# Patient Record
Sex: FEMALE | Race: WHITE | NOT HISPANIC OR LATINO
[De-identification: names, ages, dates, MRNs, and addresses within clinical notes are randomized per-mention and may not be internally consistent; named-entity substitution may affect disease eponyms.]

---

## 2018-02-07 PROBLEM — Z00.00 ENCOUNTER FOR PREVENTIVE HEALTH EXAMINATION: Status: ACTIVE | Noted: 2018-02-07

## 2018-02-13 ENCOUNTER — APPOINTMENT (OUTPATIENT)
Dept: ORTHOPEDIC SURGERY | Facility: CLINIC | Age: 68
End: 2018-02-13
Payer: MEDICARE

## 2018-02-13 VITALS
WEIGHT: 140 LBS | DIASTOLIC BLOOD PRESSURE: 69 MMHG | HEART RATE: 89 BPM | HEIGHT: 64 IN | SYSTOLIC BLOOD PRESSURE: 137 MMHG | BODY MASS INDEX: 23.9 KG/M2

## 2018-02-13 DIAGNOSIS — Z86.69 PERSONAL HISTORY OF OTHER DISEASES OF THE NERVOUS SYSTEM AND SENSE ORGANS: ICD-10-CM

## 2018-02-13 DIAGNOSIS — Z78.9 OTHER SPECIFIED HEALTH STATUS: ICD-10-CM

## 2018-02-13 DIAGNOSIS — Z87.39 PERSONAL HISTORY OF OTHER DISEASES OF THE MUSCULOSKELETAL SYSTEM AND CONNECTIVE TISSUE: ICD-10-CM

## 2018-02-13 DIAGNOSIS — S42.292P OTHER DISPLACED FRACTURE OF UPPER END OF LEFT HUMERUS, SUBSEQUENT ENCOUNTER FOR FRACTURE WITH MALUNION: ICD-10-CM

## 2018-02-13 DIAGNOSIS — Z86.39 PERSONAL HISTORY OF OTHER ENDOCRINE, NUTRITIONAL AND METABOLIC DISEASE: ICD-10-CM

## 2018-02-13 DIAGNOSIS — Z86.79 PERSONAL HISTORY OF OTHER DISEASES OF THE CIRCULATORY SYSTEM: ICD-10-CM

## 2018-02-13 DIAGNOSIS — Z87.891 PERSONAL HISTORY OF NICOTINE DEPENDENCE: ICD-10-CM

## 2018-02-13 PROCEDURE — 73030 X-RAY EXAM OF SHOULDER: CPT | Mod: LT

## 2018-02-13 PROCEDURE — 99203 OFFICE O/P NEW LOW 30 MIN: CPT

## 2018-02-13 RX ORDER — METFORMIN HYDROCHLORIDE 500 MG/1
500 TABLET, COATED ORAL
Refills: 0 | Status: ACTIVE | COMMUNITY

## 2018-02-13 RX ORDER — ASPIRIN ENTERIC COATED TABLETS 81 MG 81 MG/1
81 TABLET, DELAYED RELEASE ORAL
Refills: 0 | Status: ACTIVE | COMMUNITY

## 2018-02-13 RX ORDER — GLIMEPIRIDE 4 MG/1
4 TABLET ORAL
Refills: 0 | Status: ACTIVE | COMMUNITY

## 2018-02-13 RX ORDER — OMEGA-3/DHA/EPA/FISH OIL 1200 MG
1200 CAPSULE ORAL
Refills: 0 | Status: ACTIVE | COMMUNITY

## 2018-02-13 RX ORDER — BACILLUS COAGULANS/INULIN 1B-250 MG
CAPSULE ORAL
Refills: 0 | Status: ACTIVE | COMMUNITY

## 2018-02-13 RX ORDER — CAMPHOR 0.45 %
25 GEL (GRAM) TOPICAL
Refills: 0 | Status: ACTIVE | COMMUNITY

## 2021-11-11 ENCOUNTER — NON-APPOINTMENT (OUTPATIENT)
Age: 71
End: 2021-11-11

## 2021-11-19 ENCOUNTER — APPOINTMENT (OUTPATIENT)
Dept: PAIN MANAGEMENT | Facility: CLINIC | Age: 71
End: 2021-11-19
Payer: MEDICARE

## 2021-11-19 VITALS
HEIGHT: 63 IN | TEMPERATURE: 98 F | SYSTOLIC BLOOD PRESSURE: 152 MMHG | WEIGHT: 140 LBS | BODY MASS INDEX: 24.8 KG/M2 | DIASTOLIC BLOOD PRESSURE: 83 MMHG

## 2021-11-19 PROCEDURE — 99204 OFFICE O/P NEW MOD 45 MIN: CPT

## 2021-11-19 NOTE — PHYSICAL EXAM
[Normal muscle bulk without asymmetry] : normal muscle bulk without asymmetry [Facet Tenderness] : no facet tenderness [] : Motor: [NL] : normal and symmetric bilaterally [Normal] : Normal affect [de-identified] : Constitutional: Normal, well developed, no acute distress\par Eyes: Symmetric, External structures \par Oropharynx: Lips normal, symmetric, no external lesions appreciated\par Respiratory: Non-labored breathing, no audible wheezes\par Cardiac: Pulse palpated, no tachycardia\par Vascular: No cyanosis appreciated, no edema in bilateral lower extremities\par GI: Nondistended, no jaundice appreciated\par Neurovascular: CN2-12 grossly intact, Alert and oriented\par MSK: Normal muscle bulk, 5/5 Motor strength B/L in LE\par \par

## 2021-11-19 NOTE — ASSESSMENT
[FreeTextEntry1] : >> Imaging and Other Studies\par \par I personally reviewed the relevant imaging.  Discussed and explained to patient the likely source of pathology and pain.  Questions answered. XR\par \par back and leg pain likely secondary to lumbar radiculopathy and discogenic pain refractory to conservative treatments including 6 consecutive weeks of home exercises/PT, will obtain MRI LS to evaluate for pathology\par \par may consider PT vs intervention pending eval\par \par >> Therapy and Other Modalities\par \par >> Medications\par  \par >> Interventions\par \par na\par \par >> Consults\par \par \par patient very interested in surgical intervention - may consider evaluation with Dr. Meza\par \par >> Discussion of Risks/Benefits/Alternatives\par \par 	>Regarding any scheduled procedures:\par \par I have discussed in detail with the patient that any interventional pain procedure is associated with potential risks.  The procedure may include an injection of steroids and potentially other medications (local anesthetic and normal saline) into the epidural space or surrounding tissue of the spine.  There are significant risks of this procedure which include and are not limited to infection, bleeding, worsening pain, dural puncture leading to postdural puncture headache, nerve damage, spinal cord injury, paralysis, stroke, and death.  \par \par There is a chance that the procedure does not improve their pain.  \par \par There are risks associated with the steroid being absorbed into the body systemically.  These include dysphoria, difficulty sleeping, mood swings and personality changes.  Premenopausal women may notice an irregularity in her menstrual cycle for 2-3 months following the injection.  Steroids can specifically affect patients with hypertension, diabetes, and peptic ulcers.  The procedure may cause a temporary increase in blood pressure and blood pressure, and may adversely affect a peptic ulcer.  Other, more rare complications, include avascular necrosis of joints, glaucoma and worsening of osteoporosis. \par \par I have discussed the risks of the procedure at length with the patient, and the potential benefits of pain relief.  I have offered alternatives to the procedure.  All questions were answered.  \par \par The patient expressed understanding and wishes to proceed with the procedure.\par \par 	>Regarding COVID19 Pandemic: \par \par Any planned interventional pain procedure are scheduled because further delay may cause harm or negative outcome to patient.  The goal in performing this procedure is to avoid deterioration of function, emergency room visits (which increases exposure) and reliance on opioids.  \par \par r/b/a discussed with patient, lack of evidence to conclusively determine whether pain management procedures have any positive or negative impact on the possibility of ruchi the virus and/or development of any sequelae. \par \par Patient counselled regarding timing steroid based intervention 2 weeks before or after COVID-19 vaccine administration to avoid any interaction or affect on efficacy of vaccination\par \par Patient demonstrates understanding\par \par Informed patient that risks associated with the COVID-19 infection.  Informed patient steps taken to limit the risks.  We are implementing safety precautions and following protocols consistent with the CDC and state recommendations. All patients and staff will be checked for fever or signs of illness upon entry to the facility. We will limit our steroid dose to the lowest effective therapeutic dose or in some cases steroids will not be injected at all. \par \par Patient agrees to proceed\par \par >> Conclusion\par \par The above diagnosis and treatment plan is medically reasonable and necessary based on the patient encounter \par There were no barriers to communication.\par Informed patient that I would be available for any additional questions.\par Patient was instructed to call with any worsening symptoms including severe pain, new numbness/weakness, or changes in the bowel/bladder function. \par Discussed role of nsaids in pain management and all relevant risks, if patient is continuing to require after 4 weeks the patient should f/u for alternative treatment. \par Instructed patient to maintain pain diary to monitor pain level, mobility, and function.\par \par

## 2021-11-19 NOTE — HISTORY OF PRESENT ILLNESS
[___ yrs] : [unfilled] year(s) ago [Paroxysmal] : paroxysmal [7] : an average pain level of 7/10 [5] : a minimum pain level of 5/10 [8] : a maximum pain level of 8/10 [Dull] : dull [Aching] : aching [Burning] : burning [Shooting] : shooting [Electric] : electric [Sitting] : sitting [Standing] : standing [Walking] : walking [Lifting] : lifting [Medications] : medications [Heat] : heat [Ice] : ice [FreeTextEntry1] : HPI\par \par Ms. SABA RIVERO is a 71 year F with pmhx of CAD sp stents and valvulopathy on asa 81mg followed by Dr. Shrestha, htn, DM, benign tremors, sp cervical spine decompression in 2018 Dr. Xie, Lumbar spine decompression 2006.  presents with right back, buttock, and right>>L anterolateral thigh and shin pain.  Pain is so bad that patient finds it difficult to perform adls and ambulate. denies any worsening numbness, weakness, bowel/bladder dysfunction. \par \par \par Previous and current pain medications/doses/effects:\par \par Tylenol with mild improvement\par \par Previous Pain Treatments:\par \par PT and exercises without improvement\par \par Previous Pain Injections:\par \par na\par \par Previous Diagnostic Studies/Images:\par \par XR LS 9/21\par \par Spondylosis of the lumbar spine is noted no spondylolisthesis not appreciated\par  [FreeTextEntry2] : 21 [FreeTextEntry7] : No referral. Lower Back pain  [FreeTextEntry3] : stairs,  [FreeTextEntry4] : Has done PT in the past

## 2021-12-07 ENCOUNTER — APPOINTMENT (OUTPATIENT)
Dept: PAIN MANAGEMENT | Facility: CLINIC | Age: 71
End: 2021-12-07
Payer: MEDICARE

## 2021-12-07 DIAGNOSIS — M53.3 SACROCOCCYGEAL DISORDERS, NOT ELSEWHERE CLASSIFIED: ICD-10-CM

## 2021-12-07 DIAGNOSIS — M79.2 NEURALGIA AND NEURITIS, UNSPECIFIED: ICD-10-CM

## 2021-12-07 DIAGNOSIS — M79.18 MYALGIA, OTHER SITE: ICD-10-CM

## 2021-12-07 DIAGNOSIS — G89.4 CHRONIC PAIN SYNDROME: ICD-10-CM

## 2021-12-07 DIAGNOSIS — M96.1 POSTLAMINECTOMY SYNDROME, NOT ELSEWHERE CLASSIFIED: ICD-10-CM

## 2021-12-07 PROCEDURE — 99214 OFFICE O/P EST MOD 30 MIN: CPT | Mod: 95

## 2021-12-07 NOTE — PHYSICAL EXAM
[de-identified] : \par Constitutional: Normal, well developed, no acute distress on audio/video examination\par Eyes: Symmetric, External structures on video examination\par ENT: Lips, mucosa and tongue normal on video examination\par Oropharynx: Lips normal, symmetric, no external lesions appreciated appreciated on video examination\par Respiratory: Non-labored breathing, no audible wheezes appreciated on audio/video examination\par Vascular: No cyanosis appreciated or edema appreciated on video examination\par GI:  no jaundice appreciated on video examination\par Neurovascular: CN grossly intact on video/audio examination, alert\par MSK: Normal muscle bulk on video examination\par

## 2021-12-07 NOTE — ASSESSMENT
[FreeTextEntry1] : >> Imaging and Other Studies\par \par I personally reviewed the relevant imaging.  Discussed and explained to patient the likely source of pathology and pain.  Questions answered. XR\par \par I personally reviewed the relevant imaging.  Discussed and explained to patient the likely source of pathology and pain.  Questions answered. MRI\par \par >> Therapy and Other Modalities\par \par >> Medications\par  \par acetaminophen 650mg q8h prn pain (caution <3g daily)\par \par >> Interventions\par \par na\par \par >> Consults\par \par \par patient very interested in surgical intervention -referral to Dr. Meza\par \par >> Discussion of Risks/Benefits/Alternatives\par \par 	>Regarding any scheduled procedures:\par \par I have discussed in detail with the patient that any interventional pain procedure is associated with potential risks.  The procedure may include an injection of steroids and potentially other medications (local anesthetic and normal saline) into the epidural space or surrounding tissue of the spine.  There are significant risks of this procedure which include and are not limited to infection, bleeding, worsening pain, dural puncture leading to postdural puncture headache, nerve damage, spinal cord injury, paralysis, stroke, and death.  \par \par There is a chance that the procedure does not improve their pain.  \par \par There are risks associated with the steroid being absorbed into the body systemically.  These include dysphoria, difficulty sleeping, mood swings and personality changes.  Premenopausal women may notice an irregularity in her menstrual cycle for 2-3 months following the injection.  Steroids can specifically affect patients with hypertension, diabetes, and peptic ulcers.  The procedure may cause a temporary increase in blood pressure and blood pressure, and may adversely affect a peptic ulcer.  Other, more rare complications, include avascular necrosis of joints, glaucoma and worsening of osteoporosis. \par \par I have discussed the risks of the procedure at length with the patient, and the potential benefits of pain relief.  I have offered alternatives to the procedure.  All questions were answered.  \par \par The patient expressed understanding and wishes to proceed with the procedure.\par \par 	>Regarding COVID19 Pandemic: \par \par Any planned interventional pain procedure are scheduled because further delay may cause harm or negative outcome to patient.  The goal in performing this procedure is to avoid deterioration of function, emergency room visits (which increases exposure) and reliance on opioids.  \par \par r/b/a discussed with patient, lack of evidence to conclusively determine whether pain management procedures have any positive or negative impact on the possibility of ruchi the virus and/or development of any sequelae. \par \par Patient counselled regarding timing steroid based intervention 2 weeks before or after COVID-19 vaccine administration to avoid any interaction or affect on efficacy of vaccination\par \par Patient demonstrates understanding\par \par Informed patient that risks associated with the COVID-19 infection.  Informed patient steps taken to limit the risks.  We are implementing safety precautions and following protocols consistent with the CDC and state recommendations. All patients and staff will be checked for fever or signs of illness upon entry to the facility. We will limit our steroid dose to the lowest effective therapeutic dose or in some cases steroids will not be injected at all. \par \par Patient agrees to proceed\par \par >> Conclusion\par \par The above diagnosis and treatment plan is medically reasonable and necessary based on the patient encounter \par There were no barriers to communication.\par Informed patient that I would be available for any additional questions.\par Patient was instructed to call with any worsening symptoms including severe pain, new numbness/weakness, or changes in the bowel/bladder function. \par Discussed role of nsaids in pain management and all relevant risks, if patient is continuing to require after 4 weeks the patient should f/u for alternative treatment. \par Instructed patient to maintain pain diary to monitor pain level, mobility, and function.\par \par

## 2021-12-07 NOTE — HISTORY OF PRESENT ILLNESS
[Home] : at home, [unfilled] , at the time of the visit. [Medical Office: ([facility])___] : at the medical office located in  [___ yrs] : [unfilled] year(s) ago [Paroxysmal] : paroxysmal [7] : an average pain level of 7/10 [5] : a minimum pain level of 5/10 [8] : a maximum pain level of 8/10 [Dull] : dull [Aching] : aching [Burning] : burning [Shooting] : shooting [Electric] : electric [Sitting] : sitting [Standing] : standing [Walking] : walking [Lifting] : lifting [Medications] : medications [Heat] : heat [Ice] : ice [FreeTextEntry1] : Interval Note:\par \par Since last visit the pain is not improved.  Continues to have significant pain over the right lower back, buttock, leg. Denies any additional weakness, numbness, bowel/bladder dysfunction.   Pain is so bad that patient finds it difficult to perform adls and ambulate. \par \par \par HPI\par \par Ms. SABA RIVERO is a 71 year F with pmhx of CAD sp stents and valvulopathy on asa 81mg followed by Dr. Shrestha, htn, DM, benign tremors, sp cervical spine decompression in 2018 Dr. Xie, Lumbar spine decompression 2006.  presents with right back, buttock, and right>>L anterolateral thigh and shin pain.  Pain is so bad that patient finds it difficult to perform adls and ambulate. denies any worsening numbness, weakness, bowel/bladder dysfunction. \par \par \par Previous and current pain medications/doses/effects:\par \par Tylenol with mild improvement\par \par Previous Pain Treatments:\par \par PT and exercises without improvement\par \par Previous Pain Injections:\par \par na\par \par Previous Diagnostic Studies/Images:\par \par MRI LS 12/21\par \par Mild disc bulge with posterior annular fussure at L1-2\par Mild-moderate spinal stenosis at L2-3\par Severe stenosis L3-4 and right foraminal stenosis\par Disc bulge and posterior annular fissure at L4-5 and asymmetric facet hypertrophy right more pronounced than left with encroachment on the right subarticular recess and deforming right aspect of thecal sac.  Mild-moderate canal stenosis and mild right foraminal narrowing.  THinning of right laminectomy at this level suggesting postsurgical changes\par Left sided facet arthropathy at L5-S1 \par XR LS 9/21\par \par Spondylosis of the lumbar spine is noted no spondylolisthesis not appreciated\par  [FreeTextEntry7] : No referral. Lower Back pain  [FreeTextEntry3] : stairs,  [FreeTextEntry4] : Has done PT in the past

## 2021-12-20 ENCOUNTER — APPOINTMENT (OUTPATIENT)
Dept: NEUROSURGERY | Facility: CLINIC | Age: 71
End: 2021-12-20
Payer: MEDICARE

## 2021-12-20 DIAGNOSIS — Z01.818 ENCOUNTER FOR OTHER PREPROCEDURAL EXAMINATION: ICD-10-CM

## 2021-12-20 PROCEDURE — 99215 OFFICE O/P EST HI 40 MIN: CPT

## 2021-12-20 PROCEDURE — 99205 OFFICE O/P NEW HI 60 MIN: CPT

## 2021-12-20 NOTE — PHYSICAL EXAM
[General Appearance - Alert] : alert [General Appearance - In No Acute Distress] : in no acute distress [Oriented To Time, Place, And Person] : oriented to person, place, and time [Impaired Insight] : insight and judgment were intact [Affect] : the affect was normal [Person] : oriented to person [Place] : oriented to place [Time] : oriented to time [Short Term Intact] : short term memory intact [Remote Intact] : remote memory intact [Span Intact] : the attention span was normal [Concentration Intact] : normal concentrating ability [Fluency] : fluency intact [Comprehension] : comprehension intact [Current Events] : adequate knowledge of current events [Past History] : adequate knowledge of personal past history [Vocabulary] : adequate range of vocabulary [Cranial Nerves Optic (II)] : visual acuity intact bilaterally,  pupils equal round and reactive to light [Cranial Nerves Oculomotor (III)] : extraocular motion intact [Cranial Nerves Trigeminal (V)] : facial sensation intact symmetrically [Cranial Nerves Facial (VII)] : face symmetrical [Cranial Nerves Vestibulocochlear (VIII)] : hearing was intact bilaterally [Cranial Nerves Glossopharyngeal (IX)] : tongue and palate midline [Cranial Nerves Accessory (XI - Cranial And Spinal)] : head turning and shoulder shrug symmetric [Motor Tone] : muscle tone was normal in all four extremities [Cranial Nerves Hypoglossal (XII)] : there was no tongue deviation with protrusion [Motor Strength] : muscle strength was normal in all four extremities [No Muscle Atrophy] : normal bulk in all four extremities [Sensation Tactile Decrease] : light touch was intact [Abnormal Walk] : normal gait [Balance] : balance was intact [2+] : Patella left 2+ [Past-pointing] : there was no past-pointing [Tremor] : no tremor present [Straight-Leg Raise Test - Left] : straight leg raise of the left leg was negative [Straight-Leg Raise Test - Right] : straight leg raise  of the right leg was negative

## 2021-12-20 NOTE — END OF VISIT
[FreeTextEntry3] : I have seen the patient and reviewed the case together with PA and I agree with the final recommendations and plan of care.\par \par Nasim Meza MD\par Neurosurgery\par \par  [Time Spent: ___ minutes] : I have spent [unfilled] minutes of time on the encounter. [>50% of the face to face encounter time was spent on counseling and/or coordination of care for ___] : Greater than 50% of the face to face encounter time was spent on counseling and/or coordination of care for [unfilled]

## 2021-12-20 NOTE — ASSESSMENT
[FreeTextEntry1] : I have discussed the natural history and treatment options for neurogenic claudication due to lumbar spinal stenosis with the patient. I explained the indications for observation, conservative management, medical management, physical therapy, pain management approaches and surgery. I explained the different types and surgical approaches including anterior and posterior approaches as well as decompression only procedures and instrumented fusions. I discussed the risks, benefits, possible complications and expected outcome related to each treatment option. The risks of surgery were discussed in detail including but not limited to postoperative infection at the surgical site, hospital acquired pneumonia, hospital acquired urinary tract infection, postoperative meningitis, wound dehiscence, CSF leak, stroke (ischemic and hemorrhagic), postoperative seizures, worsening motor function due to spinal cord or nerve injury, postoperative visual deficit which could be permanent (blindness) when surgery is performed in a prone position, cardiovascular complications (MI, PE, DVT) and I also explained that some of these complications could lead to sepsis, coma or even death.\par \par In the end, I recommend conservative management in the form of PT and [place] by Pain management given her medical comorbidities and confounding peripheral neuropathy and orthopedic conditions.  She should follow up with us in the office in 2 months to assess her progress. \par The patient understands the plan of care and is in agreement.  All questions answered to patient satisfaction.\par \par

## 2021-12-20 NOTE — HISTORY OF PRESENT ILLNESS
[de-identified] : SABA RIVERO is a 71 year female with a PMH of HTN, DM, HLD, benign essential tremor, spinal stenosis s/p Cervical lami/decompression 2018 by Dr. Xie and prior lumbar lami/decompression L4-5 2006, CAD s/p stents and valvuloplasty on baby ASA who presents to the office today for neurosurgical consultation due to long standing right low back, buttock and right >left anterolateral thigh and anterior shin pain.  The pain started over 10 years ago. It is exacerbated by ambulation, PT, going up and down stairs and relieved by rest and tylenol.  It radiates down mainly into right hip. There has been no known trauma precipitating the event.  The patient endorses numbness of extremities and tingling but denies weakness of extremities.  She does report multiple falls.  She has bowel or bladder incontinence that has been longstanding for many years.  The patient has not tried [place] but has been offered [place] by Dr. Bianchi, which she is currently considering.  She has done PT, and pain medications including tylenol in the last year without relief.  She has undergone Imaging in the form of MRI LS spine 12/2021 which revealed severe stenosis at L3-4 and mild-mod stenosis at L2-3 and L4-5. (see report below)\par  \par

## 2022-04-15 ENCOUNTER — HOSPITAL ENCOUNTER (EMERGENCY)
Dept: HOSPITAL 74 - JER | Age: 72
Discharge: HOME | End: 2022-04-15
Payer: COMMERCIAL

## 2022-04-15 VITALS — DIASTOLIC BLOOD PRESSURE: 76 MMHG | HEART RATE: 94 BPM | SYSTOLIC BLOOD PRESSURE: 133 MMHG | TEMPERATURE: 97.8 F

## 2022-04-15 VITALS — BODY MASS INDEX: 24.7 KG/M2

## 2022-04-15 DIAGNOSIS — E86.0: ICD-10-CM

## 2022-04-15 DIAGNOSIS — R19.7: ICD-10-CM

## 2022-04-15 DIAGNOSIS — R53.1: Primary | ICD-10-CM

## 2022-04-15 LAB
ALBUMIN SERPL-MCNC: 3.4 G/DL (ref 3.4–5)
ALP SERPL-CCNC: 59 U/L (ref 45–117)
ALT SERPL-CCNC: 20 U/L (ref 13–61)
ANION GAP SERPL CALC-SCNC: 8 MMOL/L (ref 8–16)
APPEARANCE UR: (no result)
AST SERPL-CCNC: 9 U/L (ref 15–37)
BACTERIA # UR AUTO: 719 /UL (ref 0–1359)
BASOPHILS # BLD: 0.9 % (ref 0–2)
BILIRUB SERPL-MCNC: 0.2 MG/DL (ref 0.2–1)
BILIRUB UR STRIP.AUTO-MCNC: NEGATIVE MG/DL
BUN SERPL-MCNC: 35.9 MG/DL (ref 7–18)
CALCIUM SERPL-MCNC: 9.7 MG/DL (ref 8.5–10.1)
CASTS URNS QL MICRO: 1 /UL (ref 0–3.1)
CHLORIDE SERPL-SCNC: 104 MMOL/L (ref 98–107)
CO2 SERPL-SCNC: 24 MMOL/L (ref 21–32)
COLOR UR: YELLOW
CREAT SERPL-MCNC: 1.1 MG/DL (ref 0.55–1.3)
DEPRECATED RDW RBC AUTO: 15.7 % (ref 11.6–15.6)
EOSINOPHIL # BLD: 1.1 % (ref 0–4.5)
EPITH CASTS URNS QL MICRO: 2 /UL (ref 0–25.1)
GLUCOSE SERPL-MCNC: 148 MG/DL (ref 74–106)
HCT VFR BLD CALC: 34.1 % (ref 32.4–45.2)
HGB BLD-MCNC: 11.1 GM/DL (ref 10.7–15.3)
KETONES UR QL STRIP: NEGATIVE
LEUKOCYTE ESTERASE UR QL STRIP.AUTO: (no result)
LIPASE SERPL-CCNC: 106 U/L (ref 73–393)
LYMPHOCYTES # BLD: 22.8 % (ref 8–40)
MAGNESIUM SERPL-MCNC: 2 MG/DL (ref 1.8–2.4)
MCH RBC QN AUTO: 27.1 PG (ref 25.7–33.7)
MCHC RBC AUTO-ENTMCNC: 32.4 G/DL (ref 32–36)
MCV RBC: 83.5 FL (ref 80–96)
MONOCYTES # BLD AUTO: 6.9 % (ref 3.8–10.2)
NEUTROPHILS # BLD: 68.3 % (ref 42.8–82.8)
NITRITE UR QL STRIP: NEGATIVE
PH UR: 7 [PH] (ref 5–8)
PHOSPHATE SERPL-MCNC: 4.9 MG/DL (ref 2.5–4.9)
PLATELET # BLD AUTO: 321 10^3/UL (ref 134–434)
PMV BLD: 7.9 FL (ref 7.5–11.1)
PROT SERPL-MCNC: 7 G/DL (ref 6.4–8.2)
PROT UR QL STRIP: (no result)
PROT UR QL STRIP: NEGATIVE
RBC # BLD AUTO: 23 /UL (ref 0–23.9)
RBC # BLD AUTO: 4.09 M/MM3 (ref 3.6–5.2)
SODIUM SERPL-SCNC: 137 MMOL/L (ref 136–145)
SP GR UR: 1.01 (ref 1.01–1.03)
UROBILINOGEN UR STRIP-MCNC: 0.2 MG/DL (ref 0.2–1)
WBC # BLD AUTO: 14.8 K/MM3 (ref 4–10)
WBC # UR AUTO: 2603 /UL (ref 0–25.8)

## 2022-04-15 PROCEDURE — U0005 INFEC AGEN DETEC AMPLI PROBE: HCPCS

## 2022-04-15 PROCEDURE — U0003 INFECTIOUS AGENT DETECTION BY NUCLEIC ACID (DNA OR RNA); SEVERE ACUTE RESPIRATORY SYNDROME CORONAVIRUS 2 (SARS-COV-2) (CORONAVIRUS DISEASE [COVID-19]), AMPLIFIED PROBE TECHNIQUE, MAKING USE OF HIGH THROUGHPUT TECHNOLOGIES AS DESCRIBED BY CMS-2020-01-R: HCPCS

## 2023-03-15 ENCOUNTER — APPOINTMENT (OUTPATIENT)
Dept: NEUROSURGERY | Facility: CLINIC | Age: 73
End: 2023-03-15
Payer: MEDICARE

## 2023-03-15 VITALS
DIASTOLIC BLOOD PRESSURE: 81 MMHG | HEART RATE: 73 BPM | SYSTOLIC BLOOD PRESSURE: 145 MMHG | OXYGEN SATURATION: 99 % | HEIGHT: 63 IN | BODY MASS INDEX: 24.8 KG/M2 | WEIGHT: 140 LBS

## 2023-03-15 PROCEDURE — 99215 OFFICE O/P EST HI 40 MIN: CPT

## 2023-03-15 NOTE — ASSESSMENT
[FreeTextEntry1] : I have discussed the natural history and treatment options for neurogenic claudication due to lumbar spinal stenosis with the patient. I explained the indications for observation, conservative management, medical management, physical therapy, pain management approaches and surgery. I explained the different types and surgical approaches including anterior and posterior approaches as well as decompression only procedures and instrumented fusions. I discussed the risks, benefits, possible complications and expected outcome related to each treatment option. \par \par In the end, I believe Ms. Hanks is a surgical candidate due to refractory symptoms of neurogenic claudication.  Since she has had an acute exacerbation of low back pain and right radicular pain since her MILD procedure, I would recommend new imaging in the form of MRI L spine to r/o new pathology as well as flexion extension xrays.  We will see her back in the office once imaging completed for further treatment planning.  If surgery is planned, she was instructed that she will require cardiac clearance from her cardiologist Dr. Shrestha at [facility], and would need to be cleared to be off of her ASA for 1 week prior to surgery and 1-2 weeks post operatively. \par \par The patient understands the plan of care and is in agreement.  All questions answered to patient satisfaction.\par

## 2023-03-15 NOTE — DATA REVIEWED
[de-identified] : Patient Name:   MILAGROS RIVEROALocation:PER\par Med Rec #: 	  [id number]		Account #: 	[id number]	\par YOB: 1950		Ordering:	Mack Cervantes MD	\par Age: 72	      Sex:    F		Attending:		\par PCP:	     Racheal Sue MD\par ______________________________________________________________________________________\par 						\par Exam Date: 	  03/06/23					\par Exam: 	CT LUMBAR SPINE					\par Order#:	[id number]					\par             \par \par \par Noncontrast CT lumbar spine\par \par INDICATION: Back pain, recent spinal surgical procedure.\par \par COMPARISON: None available \par \par FINDINGS:\par Preservation of lumbar lordosis. No significant scoliosis. Multilevel loss of intervertebral disc height most pronounced at the L3-4 and L4-5 levels. Vacuum disc phenomenon at L4-5. Erosive changes involving the left superior endplate of L3 as well as the midline and right superior endplates of L4 on L5 consistent with degenerative changes versus Schmorl's node. No compression fracture deformity appreciated. No blastic or lytic lesion. Spinous processes are intact. Transverse processes are intact. The bilateral psoas muscles are symmetric and intact. No paravertebral mass. The dorsal soft tissues appear grossly unremarkable. No fluid/abscess collection.\par \par At L1-2 there is no significant canal stenosis or foraminal narrowing.\par At L2-3 mild posterior disc bulge with ligamentum flavum thickening and facet arthropathy resulting in moderate lumbar canal stenosis. No significant foraminal narrowing.\par At L3-4 posterior disc bulge with ligamentum flavum thickening and bilateral facet arthropathy resulting in severe canal stenosis with moderate to severe right and left foraminal narrowing.. The visualized soft tissue thickening within this region which is possibly related to the patient's recent surgical procedure.\par At L4-5 there is disc bulge with ligamentum flavum thickening and bilateral facet arthropathy resulting in severe canal stenosis and bilateral foraminal narrowing.\par At L5-S1 there is mild posterior disc bulge with no significant canal stenosis. No significant foraminal narrowing.\par \par Limited evaluation of the included lower abdomen and pelvis demonstrates stool distended rectum. Partially visualized distended urinary bladder. And an incompletely visualized left renal cyst.\par \par \par IMPRESSION:\par No compression deformity. No fluid/abscess collection within the surrounding soft tissues.\par \par Marked degenerative changes at the L3-4 and L4-5 level where there is severe canal stenosis and moderate to severe bilateral foraminal narrowing. At L2-3 there is moderate canal stenosis. Please note that these levels are poorly evaluated on this CT study. (If the patient's symptoms persist and there is clinical concern for spinal infection consider further correlation with contrast MRI).\par \par --- End of Report ---\par \par ***Electronically Signed ***\par -----------------------------------------------Claudiapar Kamran Peng MD              03/06/23 2044\par \par Dictated on 03/06/23\par

## 2023-03-15 NOTE — END OF VISIT
[FreeTextEntry3] : I have seen the patient and reviewed the case together with PA and I agree with the final recommendations and plan of care.\par \par Nasim Meza MD\par Neurosurgery\par \par

## 2023-03-15 NOTE — ASSESSMENT
[FreeTextEntry1] : I have discussed the natural history and treatment options for neurogenic claudication due to lumbar spinal stenosis with the patient. I explained the indications for observation, conservative management, medical management, physical therapy, pain management approaches and surgery. I explained the different types and surgical approaches including anterior and posterior approaches as well as decompression only procedures and instrumented fusions. I discussed the risks, benefits, possible complications and expected outcome related to each treatment option. \par \par In the end, I believe Ms. Hanks is a surgical candidate due to refractory symptoms of neurogenic claudication.  Since she has had an acute exacerbation of low back pain and right radicular pain since her MILD procedure, I would recommend new imaging in the form of MRI L spine to r/o new pathology as well as flexion extension xrays.  We will see her back in the office once imaging completed for further treatment planning.  If surgery is planned, she was instructed that she will require cardiac clearance from her cardiologist Dr. Shrestha at [facility], and would need to be cleared to be off of her ASA for 1 week prior to surgery and 1-2 weeks post operatively. \par \par The patient understands the plan of care and is in agreement.  All questions answered to patient satisfaction.\par

## 2023-03-15 NOTE — HISTORY OF PRESENT ILLNESS
[FreeTextEntry1] : Ms. Hanks presents today for neurosurgical follow up.  She was previously seen in our office in December 2021 for complaints of long-standing low back pain, radiating to her buttock and bilateral thighs (right worse than left). Her pain is exacerbated when she is walking and standing for long periods of time, and she describes spasm like pain in the low back at times.  She has R leg/knee buckling and associated numbness into her right foot. She still reports some right groin intermittently at times.  Her spinal surgery history is significant for cervical laminectomy and decompression in 2018 and lumbar laminectomy and decompression at L4-5 in 2006.  At end of last appointment, conservative management in the form physical therapy and pain management evaluation.  This was implemented by the patient and despite conservative treatment, she continues to have progressively worsening symptoms. She reports exacerbation of pain with PT and after MILD procedure and no change in pain after [place].  \par \par She recently underwent MILD procedure under the direction of Dr. José from pain management on 2/23/23.  Since then, she continues to have persistent low back pain.  Localized to the right side of her lower back, radiating into the right buttock and right thigh.  Associated numbness of the right thigh.  She presented to [facility] ED on 3/6/23 with continued exacerbation of symptoms and pain control.  CT of the lumbar spine performed (full details below) revealed no acute changes.  She denies bowel/bladder incontinence, gait instability.\par \par 12/20/21 SABA HANKS is a 71 year female with a PMH of HTN, DM, HLD, benign essential tremor, spinal stenosis s/p Cervical lami/decompression 2018 by Dr. Xie and prior lumbar lami/decompression L4-5 2006, CAD s/p stents and valvuloplasty on baby ASA who presents to the office today for neurosurgical consultation due to long standing right low back, buttock and right >left anterolateral thigh and anterior shin pain. The pain started over 10 years ago. It is exacerbated by ambulation, PT, going up and down stairs and relieved by rest and tylenol. It radiates down mainly into right hip. There has been no known trauma precipitating the event. The patient endorses numbness of extremities and tingling but denies weakness of extremities. She does report multiple falls. She has bowel or bladder incontinence that has been longstanding for many years. The patient has not tried [place] but has been offered [place] by Dr. Bianchi, which she is currently considering. She has done PT, and pain medications including tylenol in the last year without relief. She has undergone Imaging in the form of MRI LS spine 12/2021 which revealed severe stenosis at L3-4 and mild-mod stenosis at L2-3 and L4-5. (see report below)

## 2023-03-15 NOTE — HISTORY OF PRESENT ILLNESS
[FreeTextEntry1] : Ms. Hanks presents today for neurosurgical follow up.  She was previously seen in our office in December 2021 for complaints of long-standing low back pain, radiating to her buttock and bilateral thighs (right worse than left). Her pain is exacerbated when she is walking and standing for long periods of time, and she describes spasm like pain in the low back at times.  She has R leg/knee buckling and associated numbness into her right foot. She still reports some right groin intermittently at times.  Her spinal surgery history is significant for cervical laminectomy and decompression in 2018 and lumbar laminectomy and decompression at L4-5 in 2006.  At end of last appointment, conservative management in the form physical therapy and pain management evaluation.  This was implemented by the patient and despite conservative treatment, she continues to have progressively worsening symptoms. She reports exacerbation of pain with PT and after MILD procedure and no change in pain after [place].  \par \par She recently underwent MILD procedure under the direction of Dr. José from pain management on 2/23/23.  Since then, she continues to have persistent low back pain.  Localized to the right side of her lower back, radiating into the right buttock and right thigh.  Associated numbness of the right thigh.  She presented to [facility] ED on 3/6/23 with continued exacerbation of symptoms and pain control.  CT of the lumbar spine performed (full details below) revealed no acute changes.  She denies bowel/bladder incontinence, gait instability.\par \par 12/20/21 SABA HANKS is a 71 year female with a PMH of HTN, DM, HLD, benign essential tremor, spinal stenosis s/p Cervical lami/decompression 2018 by Dr. Xie and prior lumbar lami/decompression L4-5 2006, CAD s/p stents and valvuloplasty on baby ASA who presents to the office today for neurosurgical consultation due to long standing right low back, buttock and right >left anterolateral thigh and anterior shin pain. The pain started over 10 years ago. It is exacerbated by ambulation, PT, going up and down stairs and relieved by rest and tylenol. It radiates down mainly into right hip. There has been no known trauma precipitating the event. The patient endorses numbness of extremities and tingling but denies weakness of extremities. She does report multiple falls. She has bowel or bladder incontinence that has been longstanding for many years. The patient has not tried [place] but has been offered [place] by Dr. Bianchi, which she is currently considering. She has done PT, and pain medications including tylenol in the last year without relief. She has undergone Imaging in the form of MRI LS spine 12/2021 which revealed severe stenosis at L3-4 and mild-mod stenosis at L2-3 and L4-5. (see report below)

## 2023-03-15 NOTE — DATA REVIEWED
[de-identified] : Patient Name:   MILAGROS RIVEROALocation:PER\par Med Rec #: 	  [id number]		Account #: 	[id number]	\par YOB: 1950		Ordering:	Mack Cervantes MD	\par Age: 72	      Sex:    F		Attending:		\par PCP:	     Racheal Sue MD\par ______________________________________________________________________________________\par 						\par Exam Date: 	  03/06/23					\par Exam: 	CT LUMBAR SPINE					\par Order#:	[id number]					\par             \par \par \par Noncontrast CT lumbar spine\par \par INDICATION: Back pain, recent spinal surgical procedure.\par \par COMPARISON: None available \par \par FINDINGS:\par Preservation of lumbar lordosis. No significant scoliosis. Multilevel loss of intervertebral disc height most pronounced at the L3-4 and L4-5 levels. Vacuum disc phenomenon at L4-5. Erosive changes involving the left superior endplate of L3 as well as the midline and right superior endplates of L4 on L5 consistent with degenerative changes versus Schmorl's node. No compression fracture deformity appreciated. No blastic or lytic lesion. Spinous processes are intact. Transverse processes are intact. The bilateral psoas muscles are symmetric and intact. No paravertebral mass. The dorsal soft tissues appear grossly unremarkable. No fluid/abscess collection.\par \par At L1-2 there is no significant canal stenosis or foraminal narrowing.\par At L2-3 mild posterior disc bulge with ligamentum flavum thickening and facet arthropathy resulting in moderate lumbar canal stenosis. No significant foraminal narrowing.\par At L3-4 posterior disc bulge with ligamentum flavum thickening and bilateral facet arthropathy resulting in severe canal stenosis with moderate to severe right and left foraminal narrowing.. The visualized soft tissue thickening within this region which is possibly related to the patient's recent surgical procedure.\par At L4-5 there is disc bulge with ligamentum flavum thickening and bilateral facet arthropathy resulting in severe canal stenosis and bilateral foraminal narrowing.\par At L5-S1 there is mild posterior disc bulge with no significant canal stenosis. No significant foraminal narrowing.\par \par Limited evaluation of the included lower abdomen and pelvis demonstrates stool distended rectum. Partially visualized distended urinary bladder. And an incompletely visualized left renal cyst.\par \par \par IMPRESSION:\par No compression deformity. No fluid/abscess collection within the surrounding soft tissues.\par \par Marked degenerative changes at the L3-4 and L4-5 level where there is severe canal stenosis and moderate to severe bilateral foraminal narrowing. At L2-3 there is moderate canal stenosis. Please note that these levels are poorly evaluated on this CT study. (If the patient's symptoms persist and there is clinical concern for spinal infection consider further correlation with contrast MRI).\par \par --- End of Report ---\par \par ***Electronically Signed ***\par -----------------------------------------------Claudiapar Kamran Peng MD              03/06/23 2044\par \par Dictated on 03/06/23\par

## 2023-03-15 NOTE — PHYSICAL EXAM
[FreeTextEntry1] : Constitutional: alert and in no acute distress. \par +tremor of head and trunk\par Cranial Nerves: visual acuity intact bilaterally, pupils equal round and reactive to light, extraocular motion intact, facial sensation intact symmetrically, face symmetrical, hearing was intact bilaterally, tongue and palate midline, head turning and shoulder shrug symmetric and there was no tongue deviation with protrusion. \par Motor: muscle tone was normal in all four extremities, muscle strength was normal in all four extremities and normal bulk in all four extremities. \par Sensory exam: light touch was intact. \par Coordination:. normal gait. balance was intact. there was no past-pointing. no tremor present. \par Deep tendon reflexes: \par Biceps right 2+. Biceps left 2+.  \par Triceps right 2+. Triceps left 2+.  \par Patella right 1+. Patella left 1+.  \par No clonus\par Spine: \par Lumbar/sacral spine examination demonstrates. straight leg raise of the left leg was negative. straight leg raise of the right leg was mildly positive \par

## 2023-04-01 DIAGNOSIS — Z01.810 ENCOUNTER FOR PREPROCEDURAL CARDIOVASCULAR EXAMINATION: ICD-10-CM

## 2023-04-03 ENCOUNTER — APPOINTMENT (OUTPATIENT)
Dept: NEUROSURGERY | Facility: CLINIC | Age: 73
End: 2023-04-03
Payer: MEDICARE

## 2023-04-03 VITALS
SYSTOLIC BLOOD PRESSURE: 119 MMHG | OXYGEN SATURATION: 100 % | WEIGHT: 140 LBS | HEART RATE: 71 BPM | BODY MASS INDEX: 24.8 KG/M2 | DIASTOLIC BLOOD PRESSURE: 69 MMHG | HEIGHT: 63 IN

## 2023-04-03 DIAGNOSIS — M25.559 PAIN IN UNSPECIFIED HIP: ICD-10-CM

## 2023-04-03 PROCEDURE — 99215 OFFICE O/P EST HI 40 MIN: CPT

## 2023-04-03 NOTE — HISTORY OF PRESENT ILLNESS
[FreeTextEntry1] : Ms. Hanks presents today for neurosurgical follow up and imaging review.  Previous note and interval history reviewed.  Reports no significant changes in her previously mentioned symptoms except for her back pain has worsened and she reports some increased right groin bilateral hip pain s/p fall she took at home a few weeks ago.  She has been c/o increasing weakness bilateral legs R>L.  Completed MRI lumbar spine without contrast, CT scan and flexion/extension xrays that showed no dynamic instability. Of note, she has RLE cellulitis on her right ankle/calf that she is on po antibiotics for that she received in the ED. \par Surg Hx: Coronary Stent/Valvuloplasty, MILD procedure, cervical laminectomy and decompression in 2018 and lumbar laminectomy and decompression at L4-5 in 2006\par Meds: ASA, tizanidine, Naproxen, Gabapentin, Amitriptyline, jardiance, glipizide, Metoprolol, metformin, amlodipine/olmesartan, levemir, Pravastatin, Vitamins, oxycodone\par Allergies: NKDA\par Soc Hx: former smoker, no EtOH, lives with family, lives in apartment\par \par 3/15/23: Ms. Hanks presents today for neurosurgical follow up. She was previously seen in our office in December 2021 for complaints of long-standing low back pain, radiating to her buttock and bilateral thighs (right worse than left). Her pain is exacerbated when she is walking and standing for long periods of time, and she describes spasm like pain in the low back at times. She has R leg/knee buckling and associated numbness into her right foot. She still reports some right groin intermittently at times. Her spinal surgery history is significant for cervical laminectomy and decompression in 2018 and lumbar laminectomy and decompression at L4-5 in 2006. At end of last appointment, conservative management in the form physical therapy and pain management evaluation. This was implemented by the patient and despite conservative treatment, she continues to have progressively worsening symptoms. She reports exacerbation of pain with PT and after MILD procedure and no change in pain after [place]. \par She recently underwent MILD procedure under the direction of Dr. José from pain management on 2/23/23. Since then, she continues to have persistent low back pain. Localized to the right side of her lower back, radiating into the right buttock and right thigh. Associated numbness of the right thigh. She presented to [facility] ED on 3/6/23 with continued exacerbation of symptoms and pain control. CT of the lumbar spine performed (full details below) revealed no acute changes. She denies bowel/bladder incontinence, gait instability.\par \par 12/20/21 SABA HANKS is a 71 year female with a PMH of HTN, DM, HLD, benign essential tremor, spinal stenosis s/p Cervical lami/decompression 2018 by Dr. Xie and prior lumbar lami/decompression L4-5 2006, CAD s/p stents and valvuloplasty on baby ASA who presents to the office today for neurosurgical consultation due to long standing right low back, buttock and right >left anterolateral thigh and anterior shin pain. The pain started over 10 years ago. It is exacerbated by ambulation, PT, going up and down stairs and relieved by rest and tylenol. It radiates down mainly into right hip. There has been no known trauma precipitating the event. The patient endorses numbness of extremities and tingling but denies weakness of extremities. She does report multiple falls. She has bowel or bladder incontinence that has been longstanding for many years. The patient has not tried [place] but has been offered [place] by Dr. Bianchi, which she is currently considering. She has done PT, and pain medications including tylenol in the last year without relief. She has undergone Imaging in the form of MRI LS spine 12/2021 which revealed severe stenosis at L3-4 and mild-mod stenosis at L2-3 and L4-5. (see report below)

## 2023-04-03 NOTE — PHYSICAL EXAM
[FreeTextEntry1] : Constitutional: alert and in no acute distress. \par +tremor of head and trunk\par Cranial Nerves: visual acuity intact bilaterally, pupils equal round and reactive to light, extraocular motion intact, facial sensation intact symmetrically, face symmetrical, hearing was intact bilaterally, tongue and palate midline, head turning and shoulder shrug symmetric and there was no tongue deviation with protrusion. \par Motor: muscle tone was normal in all four extremities, muscle strength was normal in all four extremities and normal bulk in all four extremities. \par Sensory exam: light touch was intact. \par Coordination:. normal gait. balance was intact. there was no past-pointing. no tremor present. \par Deep tendon reflexes: \par Biceps right 2+. Biceps left 2+. \par Triceps right 2+. Triceps left 2+. \par Patella right 1+. Patella left 1+. \par No clonus\par Spine: \par Lumbar/sacral spine examination demonstrates. straight leg raise of the left leg was negative. straight leg raise of the right leg was mildly positive \par \par RLE mild erythema, no swelling

## 2023-04-03 NOTE — ASSESSMENT
[FreeTextEntry1] : I have discussed the natural history and treatment options for neurogenic claudication due to lumbar spinal stenosis with the patient. I explained the indications for observation, conservative management, medical management, physical therapy, pain management approaches and surgery. I explained the different types and surgical approaches including anterior and posterior approaches as well as decompression only procedures and instrumented fusions. I discussed the risks, benefits, possible complications and expected outcome related to each treatment option. \par \par In the end I recommend surgical intervention in the form of bilateral L3-4 laminectomy.  The patient will need cardiac clearance and will NP clearance with the Pres[facility] Testing Department at [place] prior to the procedure. She will need to be off of her aspirin for 5 days preceding the procedure and 2 weeks following the procedure.  At the end of the discussion, the patient opted to move forward with the above plan.  We will obtain XRay pelvis and bilateral hip to r/o fracture given new hip pain s/p fall.  We will get her to a wound clinic for management of her recent right LE cellulitis. She should continue her oral cephalosporin prescribed in the ED in the meantime.  Our office will reach out to coordinate a surgical date in the coming weeks.  The patient understands the plan of care and is in agreement.  All questions answered to patient satisfaction.\par

## 2023-04-03 NOTE — DATA REVIEWED
[de-identified] : Exam: CT LUMBAR SPINE \par Order#: [id number] \par \par \par \par Noncontrast CT lumbar spine \par \par  INDICATION: Back pain, recent spinal surgical procedure. \par \par  COMPARISON: None available \par \par  FINDINGS: \par  Preservation of lumbar lordosis. No significant scoliosis. Multilevel loss of intervertebral disc \par height most pronounced at the L3-4 and L4-5 levels. Vacuum disc phenomenon at L4-5. Erosive changes \par involving the left superior endplate of L3 as well as the midline and right superior endplates of L4\par on L5 consistent with degenerative changes versus Schmorl's node. No compression fracture deformity \par appreciated. No blastic or lytic lesion. Spinous processes are intact. Transverse processes are \par intact. The bilateral psoas muscles are symmetric and intact. No paravertebral mass. The dorsal soft\par tissues appear grossly unremarkable. No fluid/abscess collection. \par \par  At L1-2 there is no significant canal stenosis or foraminal narrowing. \par  At L2-3 mild posterior disc bulge with ligamentum flavum thickening and facet arthropathy resulting\par in moderate lumbar canal stenosis. No significant foraminal narrowing. \par  At L3-4 posterior disc bulge with ligamentum flavum thickening and bilateral facet arthropathy \par resulting in severe canal stenosis with moderate to severe right and left foraminal narrowing.. The \par visualized soft tissue thickening within this region which is possibly related to the patient's \par recent surgical procedure. \par  At L4-5 there is disc bulge with ligamentum flavum thickening and bilateral facet arthropathy \par resulting in severe canal stenosis and bilateral foraminal narrowing. \par  At L5-S1 there is mild posterior disc bulge with no significant canal stenosis. No significant \par foraminal narrowing. \par \par  Limited evaluation of the included lower abdomen and pelvis demonstrates stool distended rectum. \par Partially visualized distended urinary bladder. And an incompletely visualized left renal cyst. \par \par \par  IMPRESSION: \par  No compression deformity. No fluid/abscess collection within the surrounding soft tissues. \par \par  Marked degenerative changes at the L3-4 and L4-5 level where there is severe canal stenosis and \par moderate to severe bilateral foraminal narrowing. At L2-3 there is moderate canal stenosis. Please \par note that these levels are poorly evaluated on this CT study. (If the patient's symptoms persist and\par there is clinical concern for spinal infection consider further correlation with contrast MRI). \par \par  --- End of Report --- \par \par ***Electronically Signed *** \par ----------------------------------------------- kranthi Peng MD 03/06/23 [id number]  [de-identified] : Exam: MR Lumbar Spine WO\par Findings:\par EXAM: MRI LUMBAR SPINE WITHOUT CONTRAST\par \par HISTORY: Worsening radicular pain. Patient states mostly left-sided back pain.\par \par TECHNIQUE: Sagittal T1, T2 , STIR, axial T1 and T2 weighted sequences were obtained. Intravenous Contrast: None. Scanner: WORKING OUT WORKS at 3T.\par \par FINDINGS: Comparison: 1/5/2023. There is trace scoliosis. The height and alignment of the vertebral bodies are intact. Again seen are chronic Schmorl's nodes in the superior endplates of L3 and L4 and an acute Schmorl's node in inferior endplate of L4. Pedicles are intact. There is a right hemilaminotomy at L4. 5 mm focus of marrow replacement T1 hypointense, T2 hyperintensities unchanged in the L1 vertebral body.\par \par T11-12: There is early disc degeneration with endplate osteophyte formation mildly encroaching upon the thecal sac.\par T12-L1: Intervertebral disc intact.\par L1-2: There is early disc degeneration with posterior annular tear and early endplate osteophyte formation.\par L2-3: There is early disc degeneration with posterior annular tear and early endplate osteophyte formation.\par L3-4: There is moderate disc degeneration. Findings osteophyte and hypertrophic facet osteoarthritis are resulting in severe canal stenosis, series 5 image 13. There is moderate right and mild left neural foraminal stenosis.\par L4-5: There is moderate disc degeneration. End plate osteophyte and early facet osteoarthritis are resulting in moderate canal and right neural foraminal stenosis. There is mild left neural foraminal stenosis.\par L5-S1: Intervertebral disc intact.\par \par There is no disc herniation or epidural mass. There are no intradural masses. There is no nerve root clumping. The conus medullaris tapers normally. There is no paraspinal mass. The aorta is non-dilated.\par \par IMPRESSION: No change. Slight scoliosis. Right hemilaminotomy at L4. Mild to moderate disc degeneration. Annular tears at L1-2, and L2-3. Facet osteoarthritis most severe at L3-4. Severe canal stenosis at L3-4. Moderate canal stenosis at L4-5. Mild to moderate neural foraminal encroachment without focal nerve root impingement. Chronic Schmorl's nodes at L3 and L4. Acute Schmorl's node at L4. No disc herniation, arachnoiditis or fracture. [de-identified] : Exam: XR Lumbar Spine Min 4 Views\par Findings:\par EXAM: X-RAY LUMBAR SPINE, 3 VIEWS\par \par HISTORY: Low back pain\par \par FINDINGS:\par \par Comparison: None available.\par \par There is dextroscoliosis of the thoracolumbar spine. There are degenerative changes of the lumbar spine with multilevel small osteophytes. There is disc space narrowing at L3-4 and L4-5. There are multilevel facet hypertrophic changes. No significant change in alignment when comparing lateral neutral, flexion, and extension views.\par There are right upper quadrant and pelvic surgical clips.\par \par IMPRESSION:\par \par Scoliosis. Degenerative changes.

## 2023-04-03 NOTE — END OF VISIT
[FreeTextEntry3] : I have seen the patient and reviewed the case together with PA and I agree with the final recommendations and plan of care.\par \par Nasim Mzea MD\par Neurosurgery\par \par  [Time Spent: ___ minutes] : I have spent [unfilled] minutes of time on the encounter. [>50% of the face to face encounter time was spent on counseling and/or coordination of care for ___] : Greater than 50% of the face to face encounter time was spent on counseling and/or coordination of care for [unfilled]

## 2023-04-11 DIAGNOSIS — R60.0 LOCALIZED EDEMA: ICD-10-CM

## 2023-04-13 ENCOUNTER — RESULT REVIEW (OUTPATIENT)
Age: 73
End: 2023-04-13

## 2023-04-19 ENCOUNTER — APPOINTMENT (OUTPATIENT)
Dept: CARDIOLOGY | Facility: CLINIC | Age: 73
End: 2023-04-19

## 2023-04-28 ENCOUNTER — RESULT REVIEW (OUTPATIENT)
Age: 73
End: 2023-04-28

## 2023-05-01 ENCOUNTER — TRANSCRIPTION ENCOUNTER (OUTPATIENT)
Age: 73
End: 2023-05-01

## 2023-05-05 ENCOUNTER — APPOINTMENT (OUTPATIENT)
Dept: NEUROSURGERY | Facility: HOSPITAL | Age: 73
End: 2023-05-05

## 2023-05-09 ENCOUNTER — TRANSCRIPTION ENCOUNTER (OUTPATIENT)
Age: 73
End: 2023-05-09

## 2023-05-22 ENCOUNTER — TRANSCRIPTION ENCOUNTER (OUTPATIENT)
Age: 73
End: 2023-05-22

## 2023-06-05 ENCOUNTER — APPOINTMENT (OUTPATIENT)
Dept: NEUROSURGERY | Facility: CLINIC | Age: 73
End: 2023-06-05
Payer: MEDICARE

## 2023-06-12 ENCOUNTER — APPOINTMENT (OUTPATIENT)
Dept: NEUROSURGERY | Facility: CLINIC | Age: 73
End: 2023-06-12
Payer: MEDICARE

## 2023-06-12 VITALS
WEIGHT: 140 LBS | HEART RATE: 90 BPM | SYSTOLIC BLOOD PRESSURE: 132 MMHG | DIASTOLIC BLOOD PRESSURE: 76 MMHG | BODY MASS INDEX: 24.8 KG/M2 | HEIGHT: 63 IN | OXYGEN SATURATION: 99 %

## 2023-06-12 PROCEDURE — 99024 POSTOP FOLLOW-UP VISIT: CPT

## 2023-06-12 NOTE — HISTORY OF PRESENT ILLNESS
[FreeTextEntry1] : Ms. Hanks returns today for postoperative follow up of bilateral L3/4 laminectomy on 5/5/23.  Her hospital course was unremarkable and she was discharged to acute rehab at [facility] on 5/9/23 where she did well.  Sutures were removed on 5/22 and she was discharged home with home services and rolling walker.  The patient denies new pain, numbness, tingling, weakness, bowel/bladder dysfunction, gait disturbance, fever, chills, drainage from incision, redness at incision site.  Her preoperative low back pain with right buttock and thigh radiculopathy, bilateral lower extremity weakness and right groin pain has resolved.  She resumed her home ASA for CAD/Stent without any signs of bleeding/bruising.  Her pain is well controlled on current regimen of gabapentin, oxycodone and cyclobenzaprine.  She follows with Dr. José pain management. \par PT consultation next week scheduled. Did rehab in hospital. Uses walker.\par \par Last week, fell forward resulting in left fifth digit fracture.\par \par 4/3/23: Ms. Hanks presents today for neurosurgical follow up and imaging review. Previous note and interval history reviewed. Reports no significant changes in her previously mentioned symptoms except for her back pain has worsened and she reports some increased right groin bilateral hip pain s/p fall she took at home a few weeks ago. She has been c/o increasing weakness bilateral legs R>L. Completed MRI lumbar spine without contrast, CT scan and flexion/extension xrays that showed no dynamic instability. Of note, she has RLE cellulitis on her right ankle/calf that she is on po antibiotics for that she received in the ED. \par Surg Hx: Coronary Stent/Valvuloplasty, MILD procedure, cervical laminectomy and decompression in 2018 and lumbar laminectomy and decompression at L4-5 in 2006\par Meds: ASA, tizanidine, Naproxen, Gabapentin, Amitriptyline, jardiance, glipizide, Metoprolol, metformin, amlodipine/olmesartan, levemir, Pravastatin, Vitamins, oxycodone\par Allergies: NKDA\par Soc Hx: former smoker, no EtOH, lives with family, lives in apartment\par \par 3/15/23: Ms. Hanks presents today for neurosurgical follow up. She was previously seen in our office in December 2021 for complaints of long-standing low back pain, radiating to her buttock and bilateral thighs (right worse than left). Her pain is exacerbated when she is walking and standing for long periods of time, and she describes spasm like pain in the low back at times. She has R leg/knee buckling and associated numbness into her right foot. She still reports some right groin intermittently at times. Her spinal surgery history is significant for cervical laminectomy and decompression in 2018 and lumbar laminectomy and decompression at L4-5 in 2006. At end of last appointment, conservative management in the form physical therapy and pain management evaluation. This was implemented by the patient and despite conservative treatment, she continues to have progressively worsening symptoms. She reports exacerbation of pain with PT and after MILD procedure and no change in pain after [place]. She recently underwent MILD procedure under the direction of Dr. José from pain management on 2/23/23. Since then, she continues to have persistent low back pain. Localized to the right side of her lower back, radiating into the right buttock and right thigh. Associated numbness of the right thigh. She presented to [facility] ED on 3/6/23 with continued exacerbation of symptoms and pain control. CT of the lumbar spine performed (full details below) revealed no acute changes. She denies bowel/bladder incontinence, gait instability.\par \par 12/20/21 SABA HANKS is a 71 year female with a PMH of HTN, DM, HLD, benign essential tremor, spinal stenosis s/p Cervical lami/decompression 2018 by Dr. Xie and prior lumbar lami/decompression L4-5 2006, CAD s/p stents and valvuloplasty on baby ASA who presents to the office today for neurosurgical consultation due to long standing right low back, buttock and right >left anterolateral thigh and anterior shin pain. The pain started over 10 years ago. It is exacerbated by ambulation, PT, going up and down stairs and relieved by rest and tylenol. It radiates down mainly into right hip. There has been no known trauma precipitating the event. The patient endorses numbness of extremities and tingling but denies weakness of extremities. She does report multiple falls. She has bowel or bladder incontinence that has been longstanding for many years. The patient has not tried [place] but has been offered [place] by Dr. Bianchi, which she is currently considering. She has done PT, and pain medications including tylenol in the last year without relief. She has undergone Imaging in the form of MRI LS spine 12/2021 which revealed severe stenosis at L3-4 and mild-mod stenosis at L2-3 and L4-5. (see report below)

## 2023-06-12 NOTE — ASSESSMENT
[FreeTextEntry1] : Ms. Hanks has been doing well since her bilateral L3/4 bilateral laminectomy on 5/5/23.  She was instructed to continue using fragrance-free shampoo to clean incision twice daily. She should continue the current pain regimen.  I have provided a prescription for outpatient PT to begin after home services are complete.  She should return to the office in 3 months time for a progress check.  The patient understands the plan of care and is in agreement with it.\par

## 2023-09-25 ENCOUNTER — APPOINTMENT (OUTPATIENT)
Dept: NEUROSURGERY | Facility: CLINIC | Age: 73
End: 2023-09-25

## 2023-09-25 ENCOUNTER — APPOINTMENT (OUTPATIENT)
Dept: NEUROSURGERY | Facility: CLINIC | Age: 73
End: 2023-09-25
Payer: MEDICARE

## 2023-09-25 VITALS
SYSTOLIC BLOOD PRESSURE: 132 MMHG | HEART RATE: 74 BPM | OXYGEN SATURATION: 98 % | HEIGHT: 63 IN | DIASTOLIC BLOOD PRESSURE: 82 MMHG | WEIGHT: 135 LBS | BODY MASS INDEX: 23.92 KG/M2

## 2023-09-25 PROCEDURE — 99215 OFFICE O/P EST HI 40 MIN: CPT

## 2024-01-10 ENCOUNTER — APPOINTMENT (OUTPATIENT)
Dept: CARDIOLOGY | Facility: CLINIC | Age: 74
End: 2024-01-10

## 2024-01-22 ENCOUNTER — APPOINTMENT (OUTPATIENT)
Dept: CARDIOLOGY | Facility: CLINIC | Age: 74
End: 2024-01-22
Payer: MEDICARE

## 2024-01-22 ENCOUNTER — NON-APPOINTMENT (OUTPATIENT)
Age: 74
End: 2024-01-22

## 2024-01-22 VITALS
BODY MASS INDEX: 24.8 KG/M2 | WEIGHT: 140 LBS | HEART RATE: 78 BPM | DIASTOLIC BLOOD PRESSURE: 70 MMHG | OXYGEN SATURATION: 99 % | SYSTOLIC BLOOD PRESSURE: 122 MMHG | HEIGHT: 63 IN

## 2024-01-22 DIAGNOSIS — R09.89 OTHER SPECIFIED SYMPTOMS AND SIGNS INVOLVING THE CIRCULATORY AND RESPIRATORY SYSTEMS: ICD-10-CM

## 2024-01-22 PROCEDURE — 93000 ELECTROCARDIOGRAM COMPLETE: CPT

## 2024-01-22 PROCEDURE — 99205 OFFICE O/P NEW HI 60 MIN: CPT | Mod: 25

## 2024-01-22 RX ORDER — GLIPIZIDE 5 MG/1
5 TABLET, FILM COATED, EXTENDED RELEASE ORAL DAILY
Refills: 0 | Status: ACTIVE | COMMUNITY

## 2024-01-22 RX ORDER — TICAGRELOR 90 MG/1
90 TABLET ORAL
Refills: 0 | Status: DISCONTINUED | COMMUNITY
End: 2024-01-22

## 2024-01-22 RX ORDER — BUPROPION HYDROCHLORIDE 150 MG/1
150 TABLET, FILM COATED ORAL DAILY
Refills: 0 | Status: ACTIVE | COMMUNITY

## 2024-01-22 RX ORDER — ESCITALOPRAM OXALATE 20 MG/1
20 TABLET ORAL
Refills: 0 | Status: DISCONTINUED | COMMUNITY
End: 2024-01-22

## 2024-01-22 RX ORDER — METOPROLOL SUCCINATE 100 MG/1
100 TABLET, EXTENDED RELEASE ORAL DAILY
Refills: 0 | Status: ACTIVE | COMMUNITY

## 2024-01-22 RX ORDER — AMLODIPINE BESYLATE 10 MG/1
10 TABLET ORAL
Refills: 0 | Status: DISCONTINUED | COMMUNITY
End: 2024-01-22

## 2024-01-22 RX ORDER — IBUPROFEN 400 MG
1500 TABLET ORAL DAILY
Refills: 0 | Status: ACTIVE | COMMUNITY

## 2024-01-22 RX ORDER — EMPAGLIFLOZIN 25 MG/1
25 TABLET, FILM COATED ORAL DAILY
Refills: 0 | Status: ACTIVE | COMMUNITY

## 2024-01-22 RX ORDER — SIMVASTATIN 10 MG/1
10 TABLET, FILM COATED ORAL
Refills: 0 | Status: DISCONTINUED | COMMUNITY
End: 2024-01-22

## 2024-01-22 RX ORDER — METOPROLOL TARTRATE 100 MG/1
100 TABLET ORAL DAILY
Refills: 0 | Status: ACTIVE | COMMUNITY

## 2024-01-22 RX ORDER — GABAPENTIN 300 MG
300 TABLET ORAL DAILY
Refills: 0 | Status: ACTIVE | COMMUNITY

## 2024-01-31 ENCOUNTER — APPOINTMENT (OUTPATIENT)
Dept: CARDIOLOGY | Facility: CLINIC | Age: 74
End: 2024-01-31
Payer: MEDICARE

## 2024-01-31 PROCEDURE — 36415 COLL VENOUS BLD VENIPUNCTURE: CPT

## 2024-02-07 ENCOUNTER — RESULT REVIEW (OUTPATIENT)
Age: 74
End: 2024-02-07

## 2024-02-08 ENCOUNTER — APPOINTMENT (OUTPATIENT)
Dept: NEUROSURGERY | Facility: CLINIC | Age: 74
End: 2024-02-08

## 2024-02-21 ENCOUNTER — APPOINTMENT (OUTPATIENT)
Dept: CARDIOLOGY | Facility: CLINIC | Age: 74
End: 2024-02-21

## 2024-02-21 ENCOUNTER — APPOINTMENT (OUTPATIENT)
Dept: CARDIOLOGY | Facility: CLINIC | Age: 74
End: 2024-02-21
Payer: MEDICARE

## 2024-02-21 PROCEDURE — 36415 COLL VENOUS BLD VENIPUNCTURE: CPT

## 2024-02-25 LAB
CHOLEST SERPL-MCNC: 100 MG/DL
HDLC SERPL-MCNC: 31 MG/DL
LDLC SERPL CALC-MCNC: 45 MG/DL
NONHDLC SERPL-MCNC: 69 MG/DL
TRIGL SERPL-MCNC: 136 MG/DL

## 2024-05-08 ENCOUNTER — APPOINTMENT (OUTPATIENT)
Dept: CARDIOLOGY | Facility: CLINIC | Age: 74
End: 2024-05-08
Payer: MEDICARE

## 2024-05-08 DIAGNOSIS — E78.5 HYPERLIPIDEMIA, UNSPECIFIED: ICD-10-CM

## 2024-05-08 DIAGNOSIS — I10 ESSENTIAL (PRIMARY) HYPERTENSION: ICD-10-CM

## 2024-05-08 DIAGNOSIS — I65.29 OCCLUSION AND STENOSIS OF UNSPECIFIED CAROTID ARTERY: ICD-10-CM

## 2024-05-08 DIAGNOSIS — I25.10 ATHEROSCLEROTIC HEART DISEASE OF NATIVE CORONARY ARTERY W/OUT ANGINA PECTORIS: ICD-10-CM

## 2024-05-08 DIAGNOSIS — I35.0 NONRHEUMATIC AORTIC (VALVE) STENOSIS: ICD-10-CM

## 2024-05-08 PROCEDURE — 99214 OFFICE O/P EST MOD 30 MIN: CPT

## 2024-05-08 RX ORDER — PRAVASTATIN SODIUM 10 MG/1
10 TABLET ORAL
Refills: 0 | Status: ACTIVE | COMMUNITY

## 2024-05-08 RX ORDER — AMLODIPINE BESYLATE 10 MG/1
10 TABLET ORAL DAILY
Qty: 90 | Refills: 3 | Status: ACTIVE | COMMUNITY
Start: 1900-01-01 | End: 1900-01-01

## 2024-05-08 RX ORDER — OLMESARTAN MEDOXOMIL 20 MG/1
20 TABLET, FILM COATED ORAL
Qty: 90 | Refills: 3 | Status: ACTIVE | COMMUNITY
Start: 2024-05-08 | End: 1900-01-01

## 2024-05-19 ENCOUNTER — NON-APPOINTMENT (OUTPATIENT)
Age: 74
End: 2024-05-19

## 2024-05-20 ENCOUNTER — APPOINTMENT (OUTPATIENT)
Dept: NEUROSURGERY | Facility: CLINIC | Age: 74
End: 2024-05-20
Payer: MEDICARE

## 2024-05-20 VITALS
BODY MASS INDEX: 24.8 KG/M2 | HEART RATE: 77 BPM | WEIGHT: 140 LBS | HEIGHT: 63 IN | OXYGEN SATURATION: 100 % | SYSTOLIC BLOOD PRESSURE: 147 MMHG | DIASTOLIC BLOOD PRESSURE: 75 MMHG

## 2024-05-20 DIAGNOSIS — M48.061 SPINAL STENOSIS, LUMBAR REGION WITHOUT NEUROGENIC CLAUDICATION: ICD-10-CM

## 2024-05-20 PROCEDURE — 99215 OFFICE O/P EST HI 40 MIN: CPT

## 2024-05-20 NOTE — END OF VISIT
[FreeTextEntry3] : I have seen the patient and reviewed the case together with PA and I agree with the final recommendations and plan of care.  Nasim Meza MD Neurosurgery  [Time Spent: ___ minutes] : I have spent [unfilled] minutes of time on the encounter. [>50% of the face to face encounter time was spent on counseling and/or coordination of care for ___] : Greater than 50% of the face to face encounter time was spent on counseling and/or coordination of care for [unfilled]

## 2024-05-20 NOTE — ASSESSMENT
[FreeTextEntry1] : Ms. Hanks has been doing well since her bilateral L3/4 bilateral laminectomy on 5/5/23.   She should continue the current pain regimen.  I have provided a prescription for outpatient PT for gait and balance training.  She should return to the office as needed at this point.  She should see Dr. Mackey for peripheral neuropathy and consider EMG.  The patient understands the plan of care and is in agreement with it.

## 2024-05-20 NOTE — REASON FOR VISIT
[FreeTextEntry1] : Ms. Hanks returns today for postoperative follow up of bilateral L3/4 laminectomy on 5/5/23. She has been doing well since her surgery.  She has been participating in PT.   Her preoperative low back pain with right buttock and thigh radiculopathy, bilateral lower extremity weakness and right groin pain has resolved.  Today, she reports numbness of the right thigh and bottom of her bilateral foot, burning left toe pain and some intermittent twinges in the left back and hip.  Her pain is worse while doing PT, so it is on hold currently.  Overall she is walking better and has less pain.    9/25/23: Ms. Hanks returns today for postoperative follow up of bilateral L3/4 laminectomy on 5/5/23. She has been doing well since her surgery.  She has been improving with PT.  The patient denies new pain, numbness, tingling, weakness, bowel/bladder dysfunction, gait disturbance, fever, chills, drainage from incision, redness at incision site. Her preoperative low back pain with right buttock and thigh radiculopathy, bilateral lower extremity weakness and right groin pain has resolved.  6/12/23 Ms. Hanks returns today for postoperative follow up of bilateral L3/4 laminectomy on 5/5/23. Her hospital course was unremarkable and she was discharged to acute rehab at [place] on 5/9/23 where she did well. Sutures were removed on 5/22 and she was discharged home with home services and rolling walker. The patient denies new pain, numbness, tingling, weakness, bowel/bladder dysfunction, gait disturbance, fever, chills, drainage from incision, redness at incision site. Her preoperative low back pain with right buttock and thigh radiculopathy, bilateral lower extremity weakness and right groin pain has resolved. She resumed her home ASA for CAD/Stent without any signs of bleeding/bruising. Her pain is well controlled on current regimen of gabapentin, oxycodone and cyclobenzaprine. She follows with Dr. José pain management.  PT consultation next week scheduled. Did rehab in hospital. Uses walker.  Last week, fell forward resulting in left fifth digit fracture.  4/3/23: Ms. Hanks presents today for neurosurgical follow up and imaging review. Previous note and interval history reviewed. Reports no significant changes in her previously mentioned symptoms except for her back pain has worsened and she reports some increased right groin bilateral hip pain s/p fall she took at home a few weeks ago. She has been c/o increasing weakness bilateral legs R>L. Completed MRI lumbar spine without contrast, CT scan and flexion/extension xrays that showed no dynamic instability. Of note, she has RLE cellulitis on her right ankle/calf that she is on po antibiotics for that she received in the ED. Surg Hx: Coronary Stent/Valvuloplasty, MILD procedure, cervical laminectomy and decompression in 2018 and lumbar laminectomy and decompression at L4-5 in 2006 Meds: ASA, tizanidine, Naproxen, Gabapentin, Amitriptyline, jardiance, glipizide, Metoprolol, metformin, amlodipine/olmesartan, levemir, Pravastatin, Vitamins, oxycodone Allergies: NKDA Soc Hx: former smoker, no EtOH, lives with family, lives in apartment  3/15/23: Ms. Hanks presents today for neurosurgical follow up. She was previously seen in our office in December 2021 for complaints of long-standing low back pain, radiating to her buttock and bilateral thighs (right worse than left). Her pain is exacerbated when she is walking and standing for long periods of time, and she describes spasm like pain in the low back at times. She has R leg/knee buckling and associated numbness into her right foot. She still reports some right groin intermittently at times. Her spinal surgery history is significant for cervical laminectomy and decompression in 2018 and lumbar laminectomy and decompression at L4-5 in 2006. At end of last appointment, conservative management in the form physical therapy and pain management evaluation. This was implemented by the patient and despite conservative treatment, she continues to have progressively worsening symptoms. She reports exacerbation of pain with PT and after MILD procedure and no change in pain after [place]. She recently underwent MILD procedure under the direction of Dr. José from pain management on 2/23/23. Since then, she continues to have persistent low back pain. Localized to the right side of her lower back, radiating into the right buttock and right thigh. Associated numbness of the right thigh. She presented to [facility] ED on 3/6/23 with continued exacerbation of symptoms and pain control. CT of the lumbar spine performed (full details below) revealed no acute changes. She denies bowel/bladder incontinence, gait instability.  12/20/21 SABA HANKS is a 71 year female with a PMH of HTN, DM, HLD, benign essential tremor, spinal stenosis s/p Cervical lami/decompression 2018 by Dr. Xie and prior lumbar lami/decompression L4-5 2006, CAD s/p stents and valvuloplasty on baby ASA who presents to the office today for neurosurgical consultation due to long standing right low back, buttock and right >left anterolateral thigh and anterior shin pain. The pain started over 10 years ago. It is exacerbated by ambulation, PT, going up and down stairs and relieved by rest and tylenol. It radiates down mainly into right hip. There has been no known trauma precipitating the event. The patient endorses numbness of extremities and tingling but denies weakness of extremities. She does report multiple falls. She has bowel or bladder incontinence that has been longstanding for many years. The patient has not tried [place] but has been offered [place] by Dr. Bianchi, which she is currently considering. She has done PT, and pain medications including tylenol in the last year without relief. She has undergone Imaging in the form of MRI LS spine 12/2021 which revealed severe stenosis at L3-4 and mild-mod stenosis at L2-3 and L4-5. (see report below)

## 2024-07-25 ENCOUNTER — APPOINTMENT (OUTPATIENT)
Dept: NEUROLOGY | Facility: CLINIC | Age: 74
End: 2024-07-25
Payer: MEDICARE

## 2024-07-25 DIAGNOSIS — E11.42 TYPE 2 DIABETES MELLITUS WITH DIABETIC POLYNEUROPATHY: ICD-10-CM

## 2024-07-25 PROCEDURE — 95910 NRV CNDJ TEST 7-8 STUDIES: CPT

## 2024-07-25 PROCEDURE — 95885 MUSC TST DONE W/NERV TST LIM: CPT

## 2024-07-25 PROCEDURE — 99205 OFFICE O/P NEW HI 60 MIN: CPT | Mod: 25

## 2024-07-25 RX ORDER — AMITRIPTYLINE HYDROCHLORIDE 25 MG/1
25 TABLET, FILM COATED ORAL
Refills: 0 | Status: ACTIVE | COMMUNITY

## 2024-07-25 RX ORDER — PREGABALIN 25 MG/1
25 CAPSULE ORAL
Qty: 60 | Refills: 3 | Status: ACTIVE | COMMUNITY
Start: 2024-07-25 | End: 1900-01-01

## 2024-07-25 RX ORDER — AMITRIPTYLINE HYDROCHLORIDE 10 MG/1
10 TABLET, FILM COATED ORAL
Refills: 0 | Status: ACTIVE | COMMUNITY

## 2024-07-25 NOTE — HISTORY OF PRESENT ILLNESS
[FreeTextEntry1] : 74-year-old right-handed woman who is being seen in neurologic consultation for evaluation of neuropathy.  Patient developed gestational diabetes during pregnancy 35 years ago.  After this she has been a type II diabetic.  Last hemoglobin A1c per the patient was 8.  She follows with Dr. Lemos.  She says she has a chocolate addiction which leads to elevated blood sugars.  Patient has intense numbness in her feet.  She will have intermittent burning and stinging pain in her lower extremities as well. Currently she is taking amitriptyline 35 mg at bedtime.  She also takes Wellbutrin in the morning.  She was on gabapentin 600 mg daily.  A few weeks ago she tapered herself off.  Since stopping gabapentin, she has not noticed any improvement.  She has not noticed any worsening either.  Interestingly she says she used to have pain at the top of her great toes which has gone away over the last few weeks.  The pain can be worse at night.  She has weakness in her legs.  She describes significant problems with balance.  She has not had any recent falls.  She does not use a cane or a walker consistently.  She  had a bilateral L3-4 laminectomy on May 5, 2023.  She had been participating in physical therapy.  She started developing some numbness in her right thigh and pain in the thigh.  Physical therapy worsened her symptoms.  It is on hold presently.

## 2024-07-25 NOTE — PHYSICAL EXAM
[FreeTextEntry1] : Physical examination  General: No acute distress, Awake, Alert.   Mental status  Awake, alert, and oriented to person, time and place, Normal attention span and concentration, Recent and remote memory intact, Language intact, Fund of knowledge intact.  Cranial Nerves  II: VFF  III, IV, VI: PERRL, EOMI.  V: Facial sensation is normal B/L.  VII: Facial strength is normal B/L.  VIII: Gross hearing is intact.  IX, X: Palate is midline and elevates symmetrically.  XI: Trapezius normal strength.  XII: Tongue midline without atrophy or fasciculations.   Motor exam  Muscle tone - no evidence of rigidity or resistance in all 4 extremities.  No atrophy or fasciculations  Muscle Strength: bue is 5/5 ble proximal is 4/5, distal 5/5 in legs; ehl is 4/5 on the left; 3+/5 on the right  Reflexes  1 in bue  absent at knees and ankles Plantars right: mute.  Plantars left: mute.   Coordination  Finger to nose: Normal.  Heel to shin: Normal.   Sensory  dec pp to above knees absent vibration Romberg present  Gait  wide based

## 2024-07-25 NOTE — ASSESSMENT
[FreeTextEntry1] : She underwent electrodiagnostic studies today as well.  She has severe peripheral neuropathy.  This is causing pain as well as her problems with balance.  She will stay off gabapentin.  I will start her on pregabalin 25 mg in the morning with plans to increase to twice a day after a week if tolerated.  She can continue amitriptyline 35 mg at bedtime.  I encouraged her to restart physical therapy.  The best solution to improve her neuropathic pain is to get more control of her glucose.  She will f/u with Josue Duarte NP in 3 months.

## 2024-07-25 NOTE — CONSULT LETTER
[Dear  ___] : Dear  [unfilled], [Consult Letter:] : I had the pleasure of evaluating your patient, [unfilled]. [Please see my note below.] : Please see my note below. [FreeTextEntry3] : Sincerely,  Lee Ann Ball M.D.

## 2024-08-05 ENCOUNTER — APPOINTMENT (OUTPATIENT)
Dept: CARDIOLOGY | Facility: CLINIC | Age: 74
End: 2024-08-05

## 2024-09-19 ENCOUNTER — NON-APPOINTMENT (OUTPATIENT)
Age: 74
End: 2024-09-19

## 2024-10-22 ENCOUNTER — APPOINTMENT (OUTPATIENT)
Dept: NEUROLOGY | Facility: CLINIC | Age: 74
End: 2024-10-22
Payer: MEDICARE

## 2024-10-22 DIAGNOSIS — M79.2 NEURALGIA AND NEURITIS, UNSPECIFIED: ICD-10-CM

## 2024-10-22 DIAGNOSIS — R26.89 OTHER ABNORMALITIES OF GAIT AND MOBILITY: ICD-10-CM

## 2024-10-22 PROCEDURE — 99214 OFFICE O/P EST MOD 30 MIN: CPT | Mod: 95

## 2024-10-24 ENCOUNTER — RESULT REVIEW (OUTPATIENT)
Age: 74
End: 2024-10-24

## 2024-10-25 ENCOUNTER — TRANSCRIPTION ENCOUNTER (OUTPATIENT)
Age: 74
End: 2024-10-25

## 2024-10-25 ENCOUNTER — APPOINTMENT (OUTPATIENT)
Dept: GASTROENTEROLOGY | Facility: HOSPITAL | Age: 74
End: 2024-10-25

## 2024-10-25 ENCOUNTER — RESULT REVIEW (OUTPATIENT)
Age: 74
End: 2024-10-25

## 2024-10-29 ENCOUNTER — RESULT REVIEW (OUTPATIENT)
Age: 74
End: 2024-10-29

## 2024-10-29 ENCOUNTER — APPOINTMENT (OUTPATIENT)
Dept: GASTROENTEROLOGY | Facility: HOSPITAL | Age: 74
End: 2024-10-29

## 2024-11-05 ENCOUNTER — TRANSCRIPTION ENCOUNTER (OUTPATIENT)
Age: 74
End: 2024-11-05

## 2024-11-13 DIAGNOSIS — Z00.00 ENCOUNTER FOR GENERAL ADULT MEDICAL EXAMINATION W/OUT ABNORMAL FINDINGS: ICD-10-CM

## 2024-11-27 ENCOUNTER — APPOINTMENT (OUTPATIENT)
Dept: GASTROENTEROLOGY | Facility: CLINIC | Age: 74
End: 2024-11-27

## 2024-11-29 ENCOUNTER — HOSPITAL ENCOUNTER (EMERGENCY)
Dept: HOSPITAL 74 - JER | Age: 74
LOS: 1 days | Discharge: ANOTHER HOSPITAL | End: 2024-11-30
Payer: COMMERCIAL

## 2024-11-29 VITALS — RESPIRATION RATE: 16 BRPM

## 2024-11-29 VITALS — BODY MASS INDEX: 24.7 KG/M2

## 2024-11-29 DIAGNOSIS — R07.89: ICD-10-CM

## 2024-11-29 DIAGNOSIS — E11.52: ICD-10-CM
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
## 2024-11-29 DIAGNOSIS — E86.0: ICD-10-CM

## 2024-11-29 DIAGNOSIS — S81.802A: ICD-10-CM

## 2024-11-29 DIAGNOSIS — N17.9: ICD-10-CM

## 2024-11-29 DIAGNOSIS — Z20.822: ICD-10-CM

## 2024-11-29 DIAGNOSIS — S81.801A: Primary | ICD-10-CM

## 2024-11-29 LAB
ALBUMIN SERPL-MCNC: 2.1 G/DL (ref 3.4–5)
ALP SERPL-CCNC: 95 U/L (ref 45–117)
ALT SERPL-CCNC: 31 U/L (ref 13–61)
ANION GAP SERPL CALC-SCNC: 15 MMOL/L (ref 4–13)
ANISOCYTOSIS BLD QL: (no result)
APTT BLD: 33 SECONDS (ref 25.2–36.5)
AST SERPL-CCNC: 16 U/L (ref 15–37)
BASE EXCESS BLDV CALC-SCNC: -10.8 MMOL/L (ref -2–2)
BASOPHILS # BLD: 0.2 % (ref 0–2)
BILIRUB SERPL-MCNC: 0.4 MG/DL (ref 0.2–1)
BUN SERPL-MCNC: 61.5 MG/DL (ref 7–18)
CALCIUM SERPL-MCNC: 8.5 MG/DL (ref 8.5–10.1)
CHLORIDE SERPL-SCNC: 108 MMOL/L (ref 98–107)
CO2 SERPL-SCNC: 16 MMOL/L (ref 21–32)
CREAT SERPL-MCNC: 1.5 MG/DL (ref 0.55–1.3)
DACRYOCYTES BLD QL SMEAR: (no result)
DEPRECATED RDW RBC AUTO: 38.6 % (ref 11.6–15.6)
EOSINOPHIL # BLD: 0.2 % (ref 0–4.5)
GLUCOSE SERPL-MCNC: 215 MG/DL (ref 74–106)
HCT VFR BLD CALC: 30 % (ref 32.4–45.2)
HCT VFR BLDV CALC: 29 % (ref 32.4–45.2)
HGB BLD-MCNC: 9.1 GM/DL (ref 10.7–15.3)
INR BLD: 1.05 (ref 0.83–1.09)
LYMPHOCYTES # BLD: 5.4 % (ref 8–40)
MACROCYTES BLD QL: 0
MCH RBC QN AUTO: 26.3 PG (ref 25.7–33.7)
MCHC RBC AUTO-ENTMCNC: 30.3 G/DL (ref 32–36)
MCV RBC: 86.6 FL (ref 80–96)
MONOCYTES # BLD AUTO: 5.6 % (ref 3.8–10.2)
NEUTROPHILS # BLD: 88.6 % (ref 42.8–82.8)
OVALOCYTES BLD QL SMEAR: (no result)
PCO2 BLDV: 31.8 MMHG (ref 38–52)
PH BLDV: 7.29 [PH] (ref 7.31–7.41)
PLATELET # BLD AUTO: 198 10^3/UL (ref 134–434)
PMV BLD: 8.4 FL (ref 7.5–11.1)
POTASSIUM SERPLBLD-SCNC: 4.8 MMOL/L (ref 3.5–5.1)
PROT SERPL-MCNC: 5.4 G/DL (ref 6.4–8.2)
PT PNL PPP: 11.8 SEC (ref 9.7–13)
RBC # BLD AUTO: 3.47 M/MM3 (ref 3.6–5.2)
RBC MORPH BLD: YES
SAO2 % BLDV: 42.5 % (ref 70–80)
SODIUM SERPL-SCNC: 139 MMOL/L (ref 136–145)
WBC # BLD AUTO: 20.3 K/MM3 (ref 4–10)

## 2024-11-29 PROCEDURE — 3E03329 INTRODUCTION OF OTHER ANTI-INFECTIVE INTO PERIPHERAL VEIN, PERCUTANEOUS APPROACH: ICD-10-PCS

## 2024-11-29 PROCEDURE — 3E033NZ INTRODUCTION OF ANALGESICS, HYPNOTICS, SEDATIVES INTO PERIPHERAL VEIN, PERCUTANEOUS APPROACH: ICD-10-PCS

## 2024-11-29 RX ADMIN — VANCOMYCIN HYDROCHLORIDE ONE MLS/HR: 1 INJECTION, POWDER, LYOPHILIZED, FOR SOLUTION INTRAVENOUS at 23:43

## 2024-11-29 RX ADMIN — PIPERACILLIN AND TAZOBACTAM ONE MLS/HR: 4; .5 INJECTION, POWDER, LYOPHILIZED, FOR SOLUTION INTRAVENOUS at 23:43

## 2024-11-29 RX ADMIN — LIDOCAINE PATCH 4% ONE PATCH: 40 PATCH TOPICAL at 23:44

## 2024-11-29 RX ADMIN — SODIUM CHLORIDE, POTASSIUM CHLORIDE, SODIUM LACTATE AND CALCIUM CHLORIDE ONE ML: 600; 310; 30; 20 INJECTION, SOLUTION INTRAVENOUS at 23:44

## 2024-11-29 RX ADMIN — ACETAMINOPHEN ONE MG: 10 INJECTION, SOLUTION INTRAVENOUS at 23:45

## 2024-11-30 VITALS — DIASTOLIC BLOOD PRESSURE: 40 MMHG | SYSTOLIC BLOOD PRESSURE: 84 MMHG | HEART RATE: 85 BPM | TEMPERATURE: 97.6 F

## 2024-11-30 PROCEDURE — 3E03329 INTRODUCTION OF OTHER ANTI-INFECTIVE INTO PERIPHERAL VEIN, PERCUTANEOUS APPROACH: ICD-10-PCS

## 2024-11-30 RX ADMIN — SODIUM CHLORIDE ONE ML: 9 INJECTION, SOLUTION INTRAVENOUS at 01:39

## 2024-11-30 RX ADMIN — SODIUM CHLORIDE ONE ML: 9 INJECTION, SOLUTION INTRAVENOUS at 03:06

## 2024-11-30 RX ADMIN — AZITHROMYCIN DIHYDRATE ONE MLS/HR: 500 INJECTION, POWDER, LYOPHILIZED, FOR SOLUTION INTRAVENOUS at 02:19

## 2024-12-02 ENCOUNTER — APPOINTMENT (OUTPATIENT)
Dept: HEMATOLOGY ONCOLOGY | Facility: CLINIC | Age: 74
End: 2024-12-02

## 2024-12-24 ENCOUNTER — APPOINTMENT (OUTPATIENT)
Dept: NEPHROLOGY | Facility: CLINIC | Age: 74
End: 2024-12-24

## 2024-12-26 ENCOUNTER — APPOINTMENT (OUTPATIENT)
Dept: GASTROENTEROLOGY | Facility: CLINIC | Age: 74
End: 2024-12-26

## 2025-06-05 ENCOUNTER — NON-APPOINTMENT (OUTPATIENT)
Age: 75
End: 2025-06-05

## 2025-06-06 ENCOUNTER — APPOINTMENT (OUTPATIENT)
Dept: PAIN MANAGEMENT | Facility: CLINIC | Age: 75
End: 2025-06-06
Payer: MEDICARE

## 2025-06-06 VITALS
HEIGHT: 63 IN | SYSTOLIC BLOOD PRESSURE: 133 MMHG | WEIGHT: 140 LBS | DIASTOLIC BLOOD PRESSURE: 74 MMHG | BODY MASS INDEX: 24.8 KG/M2

## 2025-06-06 PROCEDURE — 99204 OFFICE O/P NEW MOD 45 MIN: CPT

## 2025-06-06 RX ORDER — ACETAMINOPHEN 325 MG/1
325 TABLET ORAL
Refills: 0 | Status: ACTIVE | COMMUNITY

## 2025-06-06 RX ORDER — PREGABALIN 50 MG/1
50 CAPSULE ORAL DAILY
Refills: 0 | Status: ACTIVE | COMMUNITY

## 2025-06-06 RX ORDER — BACLOFEN 5 MG/1
5 TABLET ORAL
Refills: 0 | Status: ACTIVE | COMMUNITY

## 2025-06-06 RX ORDER — ZINC SULFATE 50(220)MG
220 (50 ZN) CAPSULE ORAL
Refills: 0 | Status: ACTIVE | COMMUNITY

## 2025-06-06 RX ORDER — OXYCODONE 5 MG/1
5 TABLET ORAL
Refills: 0 | Status: ACTIVE | COMMUNITY

## 2025-06-06 RX ORDER — FLUTICASONE PROPIONATE 50 MCG
50 SPRAY, SUSPENSION NASAL
Refills: 0 | Status: ACTIVE | COMMUNITY

## 2025-06-06 RX ORDER — APIXABAN 2.5 MG/1
2.5 TABLET, FILM COATED ORAL
Refills: 0 | Status: ACTIVE | COMMUNITY

## 2025-06-06 RX ORDER — BUPROPION HYDROCHLORIDE 150 MG/1
150 TABLET, EXTENDED RELEASE ORAL
Refills: 0 | Status: ACTIVE | COMMUNITY

## 2025-06-06 RX ORDER — PANTOPRAZOLE 40 MG/1
40 TABLET, DELAYED RELEASE ORAL
Refills: 0 | Status: ACTIVE | COMMUNITY

## 2025-06-06 RX ORDER — MULTIVIT-MIN/IRON/FOLIC ACID/K 18-600-40
CAPSULE ORAL
Refills: 0 | Status: ACTIVE | COMMUNITY

## 2025-06-06 RX ORDER — ACETAMINOPHEN/DIPHENHYDRAMINE 500MG-25MG
TABLET ORAL
Refills: 0 | Status: ACTIVE | COMMUNITY

## 2025-06-06 RX ORDER — PREGABALIN 100 MG/1
100 CAPSULE ORAL AT BEDTIME
Refills: 0 | Status: ACTIVE | COMMUNITY

## 2025-06-06 RX ORDER — SIMETHICONE 80 MG/1
80 TABLET, CHEWABLE ORAL
Refills: 0 | Status: ACTIVE | COMMUNITY